# Patient Record
Sex: MALE | Race: ASIAN | URBAN - METROPOLITAN AREA
[De-identification: names, ages, dates, MRNs, and addresses within clinical notes are randomized per-mention and may not be internally consistent; named-entity substitution may affect disease eponyms.]

---

## 2023-06-28 ENCOUNTER — TELEPHONE (OUTPATIENT)
Age: 43
End: 2023-06-28

## 2023-06-28 NOTE — TELEPHONE ENCOUNTER
Patient's wife called to schedule appt and was advised our office does not accept [facility]. She will decide if they want to pay out of pocket and will callback.   Records placed in 1st call attempt folder